# Patient Record
Sex: MALE
[De-identification: names, ages, dates, MRNs, and addresses within clinical notes are randomized per-mention and may not be internally consistent; named-entity substitution may affect disease eponyms.]

---

## 2019-02-08 PROBLEM — Z00.00 ENCOUNTER FOR PREVENTIVE HEALTH EXAMINATION: Status: ACTIVE | Noted: 2019-02-08

## 2019-02-20 ENCOUNTER — APPOINTMENT (OUTPATIENT)
Dept: ORTHOPEDIC SURGERY | Facility: CLINIC | Age: 54
End: 2019-02-20
Payer: COMMERCIAL

## 2019-02-20 VITALS — WEIGHT: 200 LBS | BODY MASS INDEX: 33.32 KG/M2 | HEIGHT: 65 IN

## 2019-02-20 DIAGNOSIS — D17.21 BENIGN LIPOMATOUS NEOPLASM OF SKIN AND SUBCUTANEOUS TISSUE OF RIGHT ARM: ICD-10-CM

## 2019-02-20 PROCEDURE — 99203 OFFICE O/P NEW LOW 30 MIN: CPT

## 2019-02-20 NOTE — HISTORY OF PRESENT ILLNESS
[FreeTextEntry1] : This is the first visit of 53 years old male for the last several years noted to have a rather enlarging deeply seated soft tissue mass over the lateral aspect of the right proximal arm bruising or localized tenderness

## 2019-02-20 NOTE — REASON FOR VISIT
[FreeTextEntry1] : Presented for evaluation of a soft tissue mass over the lateral aspect of the right arm

## 2019-02-20 NOTE — PHYSICAL EXAM
[FreeTextEntry1] : Physical exam reveals a healthy-looking patient in no apparent distress patient appeared to be free of their oriented having no significant complaints examination of the right arm and demonstrate a soft tissue fullness over the lateral aspect of the proximal arm MRI scan demonstrated a 51 that is it suggests a possible lipomatous mass right arm. At this stage patient was recommended for localized excision soft tissue mass right arm.